# Patient Record
Sex: FEMALE | Race: WHITE | NOT HISPANIC OR LATINO | Employment: FULL TIME | URBAN - METROPOLITAN AREA
[De-identification: names, ages, dates, MRNs, and addresses within clinical notes are randomized per-mention and may not be internally consistent; named-entity substitution may affect disease eponyms.]

---

## 2024-09-16 ENCOUNTER — OFFICE VISIT (OUTPATIENT)
Dept: OBSTETRICS AND GYNECOLOGY | Facility: CLINIC | Age: 24
End: 2024-09-16
Payer: COMMERCIAL

## 2024-09-16 VITALS — DIASTOLIC BLOOD PRESSURE: 113 MMHG | HEART RATE: 128 BPM | SYSTOLIC BLOOD PRESSURE: 145 MMHG | WEIGHT: 175.81 LBS

## 2024-09-16 DIAGNOSIS — Z01.419 WELL WOMAN EXAM WITH ROUTINE GYNECOLOGICAL EXAM: Primary | ICD-10-CM

## 2024-09-16 NOTE — PROGRESS NOTES
Subjective:      Patient ID: Sagrario Michelle is a 23 y.o. female who presents for evaluation today.    Chief Complaint:    Well Woman      History of Present Illness  Annual Exam (Premenopausal) - Patient presents for annual exam. The patient has no complaints today. The patient is sexually active. GYN screening history: no prior history of gyn screening tests. The patient wears seatbelts: yes. The patient participates in regular exercise: yes. Has the patient ever been transfused or tattooed?: not asked. The patient reports that there is not domestic violence in her life. She reports regular periods. They are manageable. She is using condoms for birth control. She got  2 mos ago, she and her  work at [employer]. She GI or  problems. She reports her blood pressures at home run 110s/80s, she struggles with white coat.    GYN History  Patient's last menstrual period was 08/27/2024.   Date of Last Pap: Pap smear refused today    VITALS  BP (!) 145/113   Pulse (!) 128   Wt 79.7 kg (175 lb 12.8 oz)   LMP 08/27/2024   Weight: 79.7 kg (175 lb 12.8 oz)         PAST MEDICAL HISTORY  History reviewed. No pertinent past medical history.    PAST SURGICAL HISTORY  History reviewed. No pertinent surgical history.    SOCIAL HISTORY  Social History     Tobacco Use   Smoking Status Never   Smokeless Tobacco Never   ,   Social History     Substance and Sexual Activity   Alcohol Use Yes    Comment: Socially/once a month        MEDICATIONS  No outpatient medications have been marked as taking for the 9/16/24 encounter (Office Visit) with Vale Siegel NP.         Review of Systems   Review of Systems   Constitutional:  Negative for activity change, chills and fever.   Eyes:  Negative for visual disturbance.   Respiratory:  Negative for shortness of breath.    Cardiovascular:  Negative for chest pain.   Gastrointestinal:  Negative for abdominal pain, constipation, diarrhea, nausea and vomiting.   Genitourinary:   Negative for dysuria, flank pain, frequency, hematuria, menorrhagia, pelvic pain, urgency, vaginal bleeding and vaginal discharge.        No abnormal vaginal bleeding   Musculoskeletal:  Negative for back pain.   Integumentary:  Negative for rash and breast mass.   Neurological:  Negative for numbness and headaches.   Psychiatric/Behavioral:  The patient is nervous/anxious.         No mood disturbance or changes    Breast: Negative for mass          Objective:     Physical Exam:   Constitutional: She is oriented to person, place, and time. She appears well-developed. She is cooperative.    HENT:   Head: Normocephalic.     Neck: Trachea normal. No thyromegaly present.    Cardiovascular:  Normal rate, regular rhythm and normal heart sounds.             Pulmonary/Chest: Effort normal and breath sounds normal.   Breast exam declined        Abdominal: Soft. There is no abdominal tenderness. There is no rebound and no guarding.     Genitourinary:    Genitourinary Comments: Pelvic exam declined                Lymphadenopathy:        Head (right side): No submental and no submandibular adenopathy present.        Head (left side): No submental and no submandibular adenopathy present.     She has no cervical adenopathy.    Neurological: She is alert and oriented to person, place, and time.    Skin: Skin is warm.    Psychiatric: She has a normal mood and affect. Her speech is normal and behavior is normal. Thought content normal.          Assessment:     Well woman exam with routine gynecological exam         Plan:     Patient instructed to contact the clinic should any questions or concerns arise prior to her next office visit. Patient is happy with the plan of care at this time, verbalizes understanding and denies outstanding questions.      Pap--refused  Self-breast exams  Birth Control discussed  Discussed the risk of breast and cervical cancers by not conducting regular breast exams & pelvic exams when sexually active  Pt  verbalized understanding of risks, she will consider next year due to anxiety  Discussed BP monitoring at home, she follows up with her PCP regularly  If you don't hear from the office regarding results within 1 week, please call  Follow up in 1 year for annual or sooner as needed

## 2025-04-15 ENCOUNTER — OFFICE VISIT (OUTPATIENT)
Dept: OBSTETRICS AND GYNECOLOGY | Facility: CLINIC | Age: 25
End: 2025-04-15
Payer: COMMERCIAL

## 2025-04-15 VITALS
DIASTOLIC BLOOD PRESSURE: 117 MMHG | WEIGHT: 179.38 LBS | BODY MASS INDEX: 29.89 KG/M2 | HEART RATE: 111 BPM | HEIGHT: 65 IN | SYSTOLIC BLOOD PRESSURE: 167 MMHG

## 2025-04-15 DIAGNOSIS — N92.0 MENORRHAGIA WITH REGULAR CYCLE: ICD-10-CM

## 2025-04-15 DIAGNOSIS — Z87.42 HISTORY OF IRREGULAR MENSTRUAL CYCLES: Primary | ICD-10-CM

## 2025-04-15 RX ORDER — EZETIMIBE 10 MG/1
10 TABLET ORAL EVERY MORNING
COMMUNITY

## 2025-04-15 RX ORDER — ACETAMINOPHEN 500 MG
1 TABLET ORAL EVERY MORNING
COMMUNITY

## 2025-04-15 NOTE — PROGRESS NOTES
"  Subjective:      Patient ID: Sagrario Michelle is a 24 y.o. female who presents for evaluation today.    Chief Complaint:    Heavy Cycles (Irregular, Heavy Cycles. )      History of Present Illness  HPI She reports her cycles have gotten much heavier since February, in addition to difficulty losing weight. She exercises/walks 6-7 days a week. Periods are regular, but lasting 4-5 days. She changes tampons every hour on a heavy day. She denies midcycle spotting. She is open to pregnancy. They have been actively trying for 6-7 months. She saw her primary last week, lab work reviewed. She is on cholesterol medicine and starting low carb diet as well.     GYN History  Patient's last menstrual period was 04/07/2025 (exact date).   Date of Last Pap: N/A    VITALS  BP (!) 167/117   Pulse (!) 111   Ht 5' 5" (1.651 m)   Wt 81.4 kg (179 lb 6.4 oz)   LMP 04/07/2025 (Exact Date)   BMI 29.85 kg/m²   Weight: 81.4 kg (179 lb 6.4 oz)   Height: 5' 5" (165.1 cm)     PAST MEDICAL HISTORY  No past medical history on file.    PAST SURGICAL HISTORY  No past surgical history on file.    SOCIAL HISTORY  Tobacco Use History[1],   Social History     Substance and Sexual Activity   Alcohol Use Yes    Comment: Socially/once a month        MEDICATIONS  Outpatient Medications Marked as Taking for the 4/15/25 encounter (Office Visit) with Vale Siegel NP   Medication Sig Dispense Refill    cholecalciferol, vitamin D3, 125 mcg (5,000 unit) Tab Take 1 tablet by mouth every morning.      ezetimibe (ZETIA) 10 mg tablet Take 10 mg by mouth every morning.           Review of Systems   Review of Systems   Constitutional:  Negative for activity change.   Eyes:  Negative for visual disturbance.   Respiratory:  Negative for shortness of breath.    Cardiovascular:  Negative for chest pain.   Gastrointestinal:  Negative for abdominal pain.   Genitourinary:  Positive for menorrhagia. Negative for vaginal bleeding.        No abnormal vaginal bleeding "   Musculoskeletal:  Negative for back pain.   Integumentary:  Negative for rash and breast mass.   Neurological:  Negative for numbness.   Psychiatric/Behavioral:          No mood disturbance or changes    Breast: Negative for mass          Objective:     Physical Exam:   Constitutional: She is oriented to person, place, and time. No distress.       Cardiovascular:  Normal rate.             Pulmonary/Chest: Effort normal. No respiratory distress.        Abdominal: Soft. She exhibits no distension. There is no abdominal tenderness.             Musculoskeletal: Moves all extremeties.       Neurological: She is alert and oriented to person, place, and time.    Skin: Skin is warm and dry.    Psychiatric: She has a normal mood and affect. Her behavior is normal.          Assessment:     History of irregular menstrual cycles  -     Insulin, Random; Future; Expected date: 04/15/2025  -     TSH; Future; Expected date: 04/15/2025  -     T4, Free; Future; Expected date: 04/15/2025  -     Estradiol; Future; Expected date: 04/15/2025  -     Follicle Stimulating Hormone; Future; Expected date: 04/15/2025  -     Prolactin; Future; Expected date: 04/15/2025  -     Luteinizing Hormone; Future; Expected date: 04/15/2025  -     Testosterone; Future; Expected date: 04/15/2025  -     Testosterone, Free; Future; Expected date: 04/15/2025  -     Antimullerian hormone (AMH); Future; Expected date: 04/15/2025  -     HCG, Serum, Qualitative; Future; Expected date: 04/15/2025  -     Comprehensive Metabolic Panel; Future; Expected date: 04/15/2025    Menorrhagia with regular cycle  -     US OB/GYN Procedure ([facility]); Future         Plan:     Patient instructed to contact the clinic should any questions or concerns arise prior to her next office visit. Patient is happy with the plan of care at this time, verbalizes understanding and denies outstanding questions.      BPs typically runs 120s/80s, 130s/80s at home. Her PCP is managing  borderline/white coat  If you don't hear from the office regarding results within 1 week, please call  Follow up for annual or sooner as needed       [1]   Social History  Tobacco Use   Smoking Status Never   Smokeless Tobacco Never

## 2025-04-16 ENCOUNTER — RESULTS FOLLOW-UP (OUTPATIENT)
Dept: OBSTETRICS AND GYNECOLOGY | Facility: CLINIC | Age: 25
End: 2025-04-16

## 2025-04-16 DIAGNOSIS — E88.819 INSULIN RESISTANCE: Primary | ICD-10-CM

## 2025-04-16 DIAGNOSIS — R79.89 ELEVATED PROLACTIN LEVEL: ICD-10-CM

## 2025-04-16 LAB
ALBUMIN SERPL-MCNC: 4.6 G/DL (ref 3.5–5.2)
ALBUMIN/GLOB SERPL ELPH: 1.5 {RATIO} (ref 1–2.7)
ALP ISOS SERPL LEV INH-CCNC: 58 U/L (ref 35–105)
ALT (SGPT): 26 U/L (ref 0–33)
ANION GAP SERPL CALC-SCNC: 12 MMOL/L (ref 8–17)
AST SERPL-CCNC: 21 U/L (ref 0–32)
BILIRUBIN, TOTAL: 0.64 MG/DL (ref 0–1.2)
BUN/CREAT SERPL: 12.6 (ref 6–20)
CALCIUM SERPL-MCNC: 9.6 MG/DL (ref 8.6–10.2)
CARBON DIOXIDE, CO2: 26 MMOL/L (ref 22–29)
CHLORIDE: 105 MMOL/L (ref 98–107)
CREAT SERPL-MCNC: 0.72 MG/DL (ref 0.5–0.9)
E2: 136 PG/ML
FREE TESTOSTERONE: 0.56 NG/DL (ref 0–1)
FSH: 5.84 MIU/ML
GFR ESTIMATION: 118.92 ML/MIN/1.73M2
GLOBULIN: 3.1 G/DL (ref 1.5–4.5)
GLUCOSE: 100 MG/DL (ref 74–106)
HCG QUALITATIVE: NEGATIVE MIU/ML
INSULIN AB SER QL: 17.2 UIU/ML (ref 2.6–24.9)
LH: 13.8 MIU/ML
POTASSIUM: 4.5 MMOL/L (ref 3.5–5.1)
PROLACTIN SERPL-MCNC: 25.5 NG/ML (ref 4.79–23.3)
PROT SNV-MCNC: 7.7 G/DL (ref 6.4–8.3)
SODIUM: 143 MMOL/L (ref 136–145)
T4, FREE: 1.3 NG/DL (ref 0.93–1.7)
TESTOST SERPL-MCNC: 46.7 NG/DL (ref 8.4–48.1)
TSH SERPL DL<=0.005 MIU/L-ACNC: 1.53 UIU/ML (ref 0.27–4.2)
UREA NITROGEN (BUN): 9.1 MG/DL (ref 6–20)

## 2025-04-16 RX ORDER — METFORMIN HYDROCHLORIDE 500 MG/1
500 TABLET, EXTENDED RELEASE ORAL
Qty: 90 TABLET | Refills: 3 | Status: SHIPPED | OUTPATIENT
Start: 2025-04-16 | End: 2026-04-16

## 2025-04-21 ENCOUNTER — PATIENT MESSAGE (OUTPATIENT)
Dept: OBSTETRICS AND GYNECOLOGY | Facility: CLINIC | Age: 25
End: 2025-04-21
Payer: COMMERCIAL

## 2025-04-21 ENCOUNTER — PROCEDURE VISIT (OUTPATIENT)
Dept: OBSTETRICS AND GYNECOLOGY | Facility: CLINIC | Age: 25
End: 2025-04-21
Payer: COMMERCIAL

## 2025-04-21 DIAGNOSIS — N92.0 MENORRHAGIA WITH REGULAR CYCLE: ICD-10-CM

## 2025-04-21 PROCEDURE — 76830 TRANSVAGINAL US NON-OB: CPT | Mod: 26,S$PBB,, | Performed by: OBSTETRICS & GYNECOLOGY

## 2025-05-06 LAB — PROLACTIN SERPL-MCNC: 34.1 NG/ML (ref 4.79–23.3)

## 2025-05-15 ENCOUNTER — RESULTS FOLLOW-UP (OUTPATIENT)
Dept: OBSTETRICS AND GYNECOLOGY | Facility: CLINIC | Age: 25
End: 2025-05-15

## 2025-06-26 ENCOUNTER — TELEPHONE (OUTPATIENT)
Dept: OBSTETRICS AND GYNECOLOGY | Facility: CLINIC | Age: 25
End: 2025-06-26
Payer: COMMERCIAL

## 2025-06-30 ENCOUNTER — TELEPHONE (OUTPATIENT)
Dept: OBSTETRICS AND GYNECOLOGY | Facility: CLINIC | Age: 25
End: 2025-06-30
Payer: COMMERCIAL

## 2025-07-01 DIAGNOSIS — Z03.89 ENCOUNTER FOR OBSERVATION FOR OTHER SUSPECTED DISEASES AND CONDITIONS RULED OUT: ICD-10-CM

## 2025-07-01 DIAGNOSIS — Z34.91 ENCOUNTER FOR PREGNANCY RELATED EXAMINATION IN FIRST TRIMESTER: Primary | ICD-10-CM

## 2025-07-03 ENCOUNTER — PROCEDURE VISIT (OUTPATIENT)
Dept: OBSTETRICS AND GYNECOLOGY | Facility: CLINIC | Age: 25
End: 2025-07-03
Payer: COMMERCIAL

## 2025-07-03 DIAGNOSIS — Z34.91 ENCOUNTER FOR PREGNANCY RELATED EXAMINATION IN FIRST TRIMESTER: ICD-10-CM

## 2025-07-07 ENCOUNTER — TELEPHONE (OUTPATIENT)
Dept: OBSTETRICS AND GYNECOLOGY | Facility: CLINIC | Age: 25
End: 2025-07-07

## 2025-07-07 ENCOUNTER — PATIENT MESSAGE (OUTPATIENT)
Dept: OBSTETRICS AND GYNECOLOGY | Facility: CLINIC | Age: 25
End: 2025-07-07